# Patient Record
Sex: MALE | Race: WHITE
[De-identification: names, ages, dates, MRNs, and addresses within clinical notes are randomized per-mention and may not be internally consistent; named-entity substitution may affect disease eponyms.]

---

## 2018-01-16 ENCOUNTER — HOSPITAL ENCOUNTER (OUTPATIENT)
Dept: HOSPITAL 89 - US | Age: 23
End: 2018-01-16
Attending: NURSE PRACTITIONER
Payer: COMMERCIAL

## 2018-01-16 DIAGNOSIS — N50.9: Primary | ICD-10-CM

## 2018-01-16 PROCEDURE — 76870 US EXAM SCROTUM: CPT

## 2018-01-16 NOTE — RADIOLOGY IMAGING REPORT
FACILITY: [facility] - [place] 

 

PATIENT NAME: Kurt Pineda

: 1995

MR: [id number]

V: [id number]

EXAM DATE: 

ORDERING PHYSICIAN: AMRIK SLOAN

TECHNOLOGIST: 

 

Location: [facility] - [place]

Patient: Kurt Pineda

: 1995

MRN: [id number]

Visit/Account:[id number]

Date of Sevice:  2018

 

ACCESSION #: [id number]

 

EXAMINATION: Scrotal ultrasound with duplex Doppler evaluation.

 

HISTORY:  Palpable lump left testicle

 

COMPARISON:  None.

 

FINDINGS:

Normal size and echogenicity of both testicles.  No focal intratesticular mass. The right testis amaris
ures 4.0 x 2.3 x 3.8 cm; the left testis 4.1 x 2.5 x 3.7 cm. Both testicles demonstrate normal and sy
mmetric vascularity with Doppler evaluation.

 

Normal appearance of the epididymal head on each side, with normal vascularity.  The epididymal head 
measures 0.8 cm on the right and 0.8 cm on the left. No visualized extratesticular mass.

 

No significant hydrocele or varicocele.

 

IMPRESSION:  Unremarkable scrotal ultrasound.

 

 

Report Dictated By: Lencho Berman MD at 2018 3:56 PM

 

Report E-Signed By: Lencho Berman MD  at 2018 3:58 PM

 

WSN:[id number]

## 2018-02-06 ENCOUNTER — HOSPITAL ENCOUNTER (OUTPATIENT)
Dept: HOSPITAL 89 - CT | Age: 23
End: 2018-02-06
Attending: NURSE PRACTITIONER
Payer: COMMERCIAL

## 2018-02-06 DIAGNOSIS — Z90.49: ICD-10-CM

## 2018-02-06 DIAGNOSIS — N28.1: Primary | ICD-10-CM

## 2018-02-06 PROCEDURE — 74177 CT ABD & PELVIS W/CONTRAST: CPT

## 2018-02-06 NOTE — RADIOLOGY IMAGING REPORT
FACILITY: [facility] - [place] 

 

PATIENT NAME: Kurt Pineda

: 1995

MR: [id number]

V: [id number]

EXAM DATE: 

ORDERING PHYSICIAN: AMRIK SLOAN

TECHNOLOGIST: 

 

[place] [facility] [place]: Kurt Pineda

: 1995

MRN: [id number]

Visit/Account:[id number]

Date of Sevice:  2018

 

ACCESSION #: [id number]

 

ABDOMEN/PELVIS WITH CONTRAST

 

HISTORY:  Left upper quadrant pain x1.5 years

 

TECHNIQUE: Following administration of IV contrast contiguous axial images acquired through the abdom
en/pelvis.  Coronal and sagittal reformatting also performed. Dose Lowering Technique

 

One of the following dose optimization techniques was utilized in the performance of this exam: Autom
ated exposure control; adjustment of the mA and/or kV according to the patient's size; or use of an i
terative  reconstruction technique.  Specific details can be referenced in the facility's radiology C
T exam operational policy.

 

 

 

CONTRAST:  75 mL Isovue-370

 

COMPARISON:  None.

 

FINDINGS:

 

Visualized lung bases:  Negative.

 

Hepatobiliary:  Postsurgical changes from a cholecystectomy

 

Spleen:  Negative.

 

Adrenals:  Negative.

 

Pancreas:  Negative.

 

Kidneys ureters or bladder: 2 cm cyst lower pole the left kidney

 

Genitalia:  Negative.

 

GI:  The appendix is visualized and does not appear inflamed.

 

Vessels/spaces/nodes:  Negative.

 

Bones/soft tissues:  Negative.

 

Additional findings:  None pertinent.

 

IMPRESSION:

 

Postsurgical changes from a cholecystectomy

 

2 cm cyst lower pole the left kidney.

 

  No CT findings to account for patient's left upper quadrant pain

 

Report Dictated By: Mariella Becerra MD at 2018 2:35 PM

 

Report E-Signed By: Mariella Becerra MD  at 2018 2:43 PM

 

WSN:[id number]

## 2018-02-08 ENCOUNTER — HOSPITAL ENCOUNTER (OUTPATIENT)
Dept: HOSPITAL 89 - LAB | Age: 23
End: 2018-02-08
Attending: NURSE PRACTITIONER
Payer: COMMERCIAL

## 2018-02-08 DIAGNOSIS — R10.12: Primary | ICD-10-CM

## 2018-02-08 LAB — PLATELET COUNT, AUTOMATED: 250 K/UL (ref 150–450)

## 2018-02-08 PROCEDURE — 83516 IMMUNOASSAY NONANTIBODY: CPT

## 2018-02-08 PROCEDURE — 82247 BILIRUBIN TOTAL: CPT

## 2018-02-08 PROCEDURE — 84520 ASSAY OF UREA NITROGEN: CPT

## 2018-02-08 PROCEDURE — 82040 ASSAY OF SERUM ALBUMIN: CPT

## 2018-02-08 PROCEDURE — 36415 COLL VENOUS BLD VENIPUNCTURE: CPT

## 2018-02-08 PROCEDURE — 84155 ASSAY OF PROTEIN SERUM: CPT

## 2018-02-08 PROCEDURE — 85025 COMPLETE CBC W/AUTO DIFF WBC: CPT

## 2018-02-08 PROCEDURE — 82374 ASSAY BLOOD CARBON DIOXIDE: CPT

## 2018-02-08 PROCEDURE — 84450 TRANSFERASE (AST) (SGOT): CPT

## 2018-02-08 PROCEDURE — 84075 ASSAY ALKALINE PHOSPHATASE: CPT

## 2018-02-08 PROCEDURE — 82565 ASSAY OF CREATININE: CPT

## 2018-02-08 PROCEDURE — 84295 ASSAY OF SERUM SODIUM: CPT

## 2018-02-08 PROCEDURE — 84460 ALANINE AMINO (ALT) (SGPT): CPT

## 2018-02-08 PROCEDURE — 82310 ASSAY OF CALCIUM: CPT

## 2018-02-08 PROCEDURE — 82150 ASSAY OF AMYLASE: CPT

## 2018-02-08 PROCEDURE — 82947 ASSAY GLUCOSE BLOOD QUANT: CPT

## 2018-02-08 PROCEDURE — 82435 ASSAY OF BLOOD CHLORIDE: CPT

## 2018-02-08 PROCEDURE — 84132 ASSAY OF SERUM POTASSIUM: CPT

## 2018-02-08 PROCEDURE — 83690 ASSAY OF LIPASE: CPT
